# Patient Record
Sex: MALE | Race: WHITE
[De-identification: names, ages, dates, MRNs, and addresses within clinical notes are randomized per-mention and may not be internally consistent; named-entity substitution may affect disease eponyms.]

---

## 2018-08-20 ENCOUNTER — HOSPITAL ENCOUNTER (OUTPATIENT)
Dept: HOSPITAL 31 - C.SDS | Age: 73
Discharge: HOME | End: 2018-08-20
Attending: UROLOGY
Payer: MEDICARE

## 2018-08-20 VITALS
HEART RATE: 67 BPM | RESPIRATION RATE: 15 BRPM | SYSTOLIC BLOOD PRESSURE: 94 MMHG | DIASTOLIC BLOOD PRESSURE: 55 MMHG | TEMPERATURE: 97.5 F

## 2018-08-20 VITALS — BODY MASS INDEX: 20.5 KG/M2

## 2018-08-20 VITALS — OXYGEN SATURATION: 100 %

## 2018-08-20 DIAGNOSIS — C61: Primary | ICD-10-CM

## 2018-08-20 DIAGNOSIS — Z95.1: ICD-10-CM

## 2018-08-20 DIAGNOSIS — I25.2: ICD-10-CM

## 2018-08-20 DIAGNOSIS — E78.5: ICD-10-CM

## 2018-08-20 DIAGNOSIS — I25.119: ICD-10-CM

## 2018-08-20 DIAGNOSIS — N40.0: ICD-10-CM

## 2018-08-20 PROCEDURE — 55700: CPT

## 2018-08-20 PROCEDURE — 88342 IMHCHEM/IMCYTCHM 1ST ANTB: CPT

## 2018-08-20 PROCEDURE — 88305 TISSUE EXAM BY PATHOLOGIST: CPT

## 2018-08-20 RX ADMIN — LIDOCAINE HYDROCHLORIDE ONE EA: 20 JELLY TOPICAL at 08:25

## 2018-08-20 RX ADMIN — LIDOCAINE HYDROCHLORIDE ONE EA: 20 JELLY TOPICAL at 08:07

## 2018-08-20 RX ADMIN — CEFTRIAXONE ONE MLS: 1 INJECTION, SOLUTION INTRAVENOUS at 08:20

## 2018-08-20 RX ADMIN — CEFTRIAXONE ONE MLS: 1 INJECTION, SOLUTION INTRAVENOUS at 08:08

## 2018-08-20 NOTE — [id number].[id number]
Surgeon's Initial Post Op Note





- Surgeon's Notes


Surgeon: anup roe


Assistant: none


Type of Anesthesia: IV Sedation


Pre-Operative Diagnosis: elevated psa


Operative Findings: same, enlarged prostate


Post-Operative Diagnosis: same


Operation Performed: PUS, Bx


Specimen/Specimens Removed: prostate bx's


Estimated Blood Loss: EBL {In ML}: 0


Blood Products Given: N/A


Drains Used: No Drains


Post-Op Condition: Good


Date of Surgery/Procedure: 08/20/18


Time of Surgery/Procedure: 09:15

## 2018-08-21 NOTE — OP
Copied To:  Mora Lincoln MD

Attending MD:  Mora Lincoln MD



UROLOGY OPERATIVE REPORT



PROCEDURE DATE:  08/20/2018



PREOPERATIVE DIAGNOSIS:  Elevated prostate-specific antigen.



POSTOPERATIVE DIAGNOSIS:  Elevated prostate-specific antigen.



PROCEDURES:  Transrectal prostate ultrasound.  Transrectal prostate biopsy.



OPERATING SURGEON:  Mora Lincoln MD



DESCRIPTION OF PROCEDURE:  As follows:  The patient received perioperative

antibiotics.  The patient was placed in lateral decubitus position.  The

rectum was prepped topically with Betadine solution and lidocaine jelly. 

Sedation was provided by the anesthesiologist.



Rectal examination was performed.  There was moderate prostatic

enlargement.  There was no induration or nodularity appreciated.  Prostate

was mobile without fixation.



_____ prostate ultrasound probe inserted per rectum.  Prostate was scanned

in sagittal and transverse planes.  Prostate was measured in three

dimensions.  Prostate volume was calculated.



FINDINGS:  There was noted to be moderate prostatic enlargement.  The

seminal vesicles were normal.  There was moderate hyperechogenicity of the

prostate.  There were no dominant hypoechoic nodules.  There was no

significant calcifications noted.  The junction of the peripheral and

transition zone was fairly well defined.



See attached ultrasound photos and measurements regarding prostate length

with height and volume.



The prostate biopsy was then performed.  Biopsies obtained from right and

left sides of prostate including base, mid and apical sections of the

prostate.  Biopsies were performed in the transverse mode.



Two course were obtained from each _____ were obtained.  Biopsies were

directed laterally as well as in the parasagittal plane as well as toward

any hypoechoic areas.



Following prostate biopsy, the ultrasound probe was removed.  Rectal

examination was performed.  There was no bleeding noted.



The patient tolerated procedure without complication.







__________________________________________

Mora Lincoln MD



DD:  08/21/2018 8:53:59

DT:  08/21/2018 8:58:30

Job # [id number]